# Patient Record
Sex: MALE | Race: WHITE | NOT HISPANIC OR LATINO | Employment: UNEMPLOYED | URBAN - NONMETROPOLITAN AREA
[De-identification: names, ages, dates, MRNs, and addresses within clinical notes are randomized per-mention and may not be internally consistent; named-entity substitution may affect disease eponyms.]

---

## 2022-08-24 ENCOUNTER — HOSPITAL ENCOUNTER (EMERGENCY)
Facility: HOSPITAL | Age: 57
Discharge: HOME | End: 2022-08-24
Attending: EMERGENCY MEDICINE | Admitting: EMERGENCY MEDICINE

## 2022-08-24 VITALS
OXYGEN SATURATION: 100 % | TEMPERATURE: 98.4 F | DIASTOLIC BLOOD PRESSURE: 102 MMHG | BODY MASS INDEX: 27.09 KG/M2 | HEART RATE: 86 BPM | RESPIRATION RATE: 18 BRPM | HEIGHT: 72 IN | WEIGHT: 200 LBS | SYSTOLIC BLOOD PRESSURE: 178 MMHG

## 2022-08-24 DIAGNOSIS — M86.9 OSTEOMYELITIS OF RIGHT FOOT, UNSPECIFIED TYPE: Primary | ICD-10-CM

## 2022-08-24 PROCEDURE — 99283 EMERGENCY DEPT VISIT LOW MDM: CPT

## 2022-08-24 RX ORDER — LISINOPRIL 20 MG/1
20 TABLET ORAL DAILY
COMMUNITY

## 2022-08-24 RX ORDER — HYDROCODONE BITARTRATE AND ACETAMINOPHEN 5; 325 MG/1; MG/1
1 TABLET ORAL ONCE
Status: COMPLETED | OUTPATIENT
Start: 2022-08-24 | End: 2022-08-24

## 2022-08-24 RX ORDER — GABAPENTIN 800 MG/1
800 TABLET ORAL 3 TIMES DAILY
COMMUNITY

## 2022-08-24 RX ORDER — LEVETIRACETAM 250 MG/1
250 TABLET ORAL 2 TIMES DAILY
COMMUNITY

## 2022-08-24 RX ADMIN — HYDROCODONE BITARTRATE AND ACETAMINOPHEN 1 TABLET: 5; 325 TABLET ORAL at 13:21

## 2022-08-24 NOTE — DISCHARGE INSTRUCTIONS
We have not been able to review your medical records as of yet. Continue your current antibiotics. We provided you with information for local foot and ankle surgeon. Return if fever, worsening symptoms.

## 2022-08-24 NOTE — ED PROVIDER NOTES
Subjective   History of Present Illness    Chief Complaint: Reported osteomyelitis of foot wants surgery  History of Present Illness: 57-year-old male presents with above complaint.  States he was scheduled for surgery in [place] while admitted, he had MRI, received broad-spectrum antibiotics, states his daughter came and picked him up to bring him to be closer to her.  He has not had surgery yet.  Was seen and evaluated at outlying ER yesterday, started on oral antibiotics, complains of some pain to the affected foot.  History of diabetes with neuropathy.  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  Denies fever.  States his podiatrist in [place] he is out of town until Friday.  Onset: Ongoing issue  Duration: Persistent  Exacerbating / Alleviating factors: Antibiotics, Betadine paint,  Associated symptoms: None      Nurses Notes reviewed and agree, including vitals, allergies, social history and prior medical history.     REVIEW OF SYSTEMS: All systems reviewed and not pertinent unless noted.    Positive for: Chronic open foot wound with reported osteomyelitis    Negative for: Fever chills vomiting drainage  Review of Systems    History reviewed. No pertinent past medical history.    No Known Allergies    History reviewed. No pertinent surgical history.    History reviewed. No pertinent family history.    Social History     Socioeconomic History   • Marital status: Single   Tobacco Use   • Smoking status: Current Every Day Smoker     Packs/day: 1.00   • Smokeless tobacco: Current User     Types: Snuff   Vaping Use   • Vaping Use: Never used   Substance and Sexual Activity   • Alcohol use: Yes     Alcohol/week: 12.0 standard drinks     Types: 12 Cans of beer per week   • Drug use: Not Currently   • Sexual activity: Defer           Objective   Physical Exam    CONSTITUTIONAL: Well developed, nontoxic 57-year-old  male,  in no acute distress.  VITAL SIGNS: per nursing, reviewed and noted  SKIN: 2 cm  webspace vertically oriented/linear ulceration with out drainage or maceration.  2 cm x 1.5 cm chronic appearing medial first MTP ulceration without drainage.  EYES: Grossly EOMI, no icterus  ENT: Normal voice.  Moist mucous membranes.  No oropharyngeal injury.  RESPIRATORY:  No increased work of breathing. No retractions.   CARDIOVASCULAR:  regular rate and rhythm, no murmurs.  Good Peripheral pulses. Good cap refill to extremities.  Extremities are pink and warm.  Specifically good dorsalis pedis and posterior tibial pulses to the right lower extremity.  GI: Abdomen soft, no distention.  MUSCULOSKELETAL: Reported tenderness to the right first MTP full ROM. Strength and tone grossly normal.  no spasms. no neck or back tenderness or spasm.   NEUROLOGIC: Alert, oriented x 3. No focal weakness. GCS 15.   PSYCH: appropriate affect.  : no bladder tenderness or distention, no CVA tenderness      Procedures     No attending physician procedures were performed on this patient.      ED Course      Provided patient with pain control with Norco on arrival.  Attempted to obtain old records from outlying ER visit yesterday as well as recent hospitalization [facility] in [place].  Consulted with orthopedist on-call Dr. Layton, advise buying any complications he would be able to follow-up outpatient, continue antibiotics.  Patient advised that he needed to go if he was not having surgery today.  Was unable to review old records to assess any wound cultures are appropriate oral antibiotics.  Did provide patient with local foot and ankle surgeon information for Dr. Catalan.  Advised continue antibiotics, continue dressing changes, use of his postop shoe, outpatient follow return precautions discussed.                             SONAL reviewed by Prem Connor,        MDM    Final diagnoses:   Osteomyelitis of right foot, unspecified type (HCC)       ED Disposition  ED Disposition     ED Disposition   Discharge     Condition   Stable    Comment   --             Jose Catalan, DPM  [address] [facility] [id number] [phone]               Medication List      No changes were made to your prescriptions during this visit.          Prem Connor,   08/24/22 1508

## 2022-08-24 NOTE — ED NOTES
At this time I contacted [facility] to obtain medical records. Awaiting fax at this time.